# Patient Record
Sex: MALE | Race: WHITE | NOT HISPANIC OR LATINO | Employment: STUDENT | URBAN - METROPOLITAN AREA
[De-identification: names, ages, dates, MRNs, and addresses within clinical notes are randomized per-mention and may not be internally consistent; named-entity substitution may affect disease eponyms.]

---
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
## 2023-09-06 ENCOUNTER — APPOINTMENT (OUTPATIENT)
Dept: RADIOLOGY | Facility: IMAGING CENTER | Age: 23
End: 2023-09-06
Payer: COMMERCIAL

## 2023-09-06 ENCOUNTER — OFFICE VISIT (OUTPATIENT)
Dept: URGENT CARE | Facility: CLINIC | Age: 23
End: 2023-09-06
Payer: COMMERCIAL

## 2023-09-06 VITALS
OXYGEN SATURATION: 98 % | RESPIRATION RATE: 16 BRPM | WEIGHT: 157 LBS | BODY MASS INDEX: 21.26 KG/M2 | HEIGHT: 72 IN | TEMPERATURE: 97.4 F | SYSTOLIC BLOOD PRESSURE: 106 MMHG | HEART RATE: 86 BPM | DIASTOLIC BLOOD PRESSURE: 64 MMHG

## 2023-09-06 DIAGNOSIS — R05.1 ACUTE COUGH: Primary | ICD-10-CM

## 2023-09-06 DIAGNOSIS — J02.9 PHARYNGITIS, UNSPECIFIED ETIOLOGY: ICD-10-CM

## 2023-09-06 DIAGNOSIS — R06.02 SHORTNESS OF BREATH: ICD-10-CM

## 2023-09-06 DIAGNOSIS — R05.1 ACUTE COUGH: ICD-10-CM

## 2023-09-06 LAB
FLUAV RNA SPEC QL NAA+PROBE: NEGATIVE
FLUBV RNA SPEC QL NAA+PROBE: NEGATIVE
RSV RNA SPEC QL NAA+PROBE: NEGATIVE
S PYO DNA SPEC NAA+PROBE: NOT DETECTED
SARS-COV-2 RNA RESP QL NAA+PROBE: NEGATIVE

## 2023-09-06 PROCEDURE — 71046 X-RAY EXAM CHEST 2 VIEWS: CPT | Mod: TC | Performed by: RADIOLOGY

## 2023-09-06 PROCEDURE — 87651 STREP A DNA AMP PROBE: CPT

## 2023-09-06 PROCEDURE — 3074F SYST BP LT 130 MM HG: CPT

## 2023-09-06 PROCEDURE — 99213 OFFICE O/P EST LOW 20 MIN: CPT

## 2023-09-06 PROCEDURE — 3078F DIAST BP <80 MM HG: CPT

## 2023-09-06 PROCEDURE — 0241U POCT CEPHEID COV-2, FLU A/B, RSV - PCR: CPT

## 2023-09-06 ASSESSMENT — ENCOUNTER SYMPTOMS
FEVER: 1
SHORTNESS OF BREATH: 1
WHEEZING: 1
EYE REDNESS: 0
SORE THROAT: 1
COUGH: 1
EYE DISCHARGE: 0
SPUTUM PRODUCTION: 1
CHILLS: 1
SINUS PAIN: 1

## 2023-09-06 NOTE — PROGRESS NOTES
Subjective:   Iker Dumont is a 22 y.o. male who presents for Shortness of Breath (Pt concerned for bronchitis)      HPI:Iker comes in today c/o night sweats, acute cough, shortness of breath, coughing up green phlegm for the last 3 days. Onset was 2 weeks ago when he describes viral upper respiratory symptoms, congestion, sore throat, cough, however he states the cough is getting worse, he is getting increased shortness of breath, and coughing up green phlegm for the last 3 days..  Patient describes symptoms as consistent. Aggravating factors include exertion.  He also states that he rented a room in the basement, which has no airflow, no ventilation, has a boiler and there, and he believes that these living conditions have contributed to his illness.  He is asking for a note that he can give to his landlord to say that he has to get out of the rental agreement because the basement apartment is causing him to be ill.  Relieving factors include none. Treatments tried at home include none. They describe their symptoms as severe.    Review of Systems   Constitutional:  Positive for chills, fever and malaise/fatigue.   HENT:  Positive for congestion, sinus pain and sore throat. Negative for ear discharge, ear pain, nosebleeds and tinnitus.    Eyes:  Negative for discharge and redness.   Respiratory:  Positive for cough, sputum production, shortness of breath and wheezing.    Cardiovascular:  Negative for chest pain and leg swelling.   Skin:  Negative for rash.   All other systems reviewed and are negative.      Medications: This patient does not have an active medication from one of the medication groupers.    Allergies: Patient has no known allergies.    Problem List: does not have a problem list on file.    Surgical History:  No past surgical history on file.    Past Social Hx:   reports that he has never smoked. He has never used smokeless tobacco. He reports that he does not drink alcohol and does not use  drugs.     Past Family Hx:   family history is not on file.     Problem list, medications, and allergies reviewed by myself today in Epic.     Objective:     /64 (BP Location: Left arm, Patient Position: Sitting, BP Cuff Size: Adult)   Pulse 86   Temp 36.3 °C (97.4 °F) (Temporal)   Resp 16   Ht 1.829 m (6')   Wt 71.2 kg (157 lb)   SpO2 98%   BMI 21.29 kg/m²     During this visit, appropriate PPE was worn, and hand hygiene was performed.    Physical Exam  Vitals reviewed.   Constitutional:       General: He is not in acute distress.     Appearance: Normal appearance. He is not ill-appearing, toxic-appearing or diaphoretic.   HENT:      Head: Normocephalic and atraumatic.      Right Ear: Tympanic membrane, ear canal and external ear normal. There is no impacted cerumen.      Left Ear: Tympanic membrane, ear canal and external ear normal. There is no impacted cerumen.      Nose: Congestion present. No rhinorrhea.      Mouth/Throat:      Mouth: Mucous membranes are moist.      Pharynx: Oropharynx is clear. Posterior oropharyngeal erythema present. No oropharyngeal exudate.   Eyes:      General: No scleral icterus.        Right eye: No discharge.         Left eye: No discharge.      Extraocular Movements: Extraocular movements intact.      Conjunctiva/sclera: Conjunctivae normal.      Pupils: Pupils are equal, round, and reactive to light.   Cardiovascular:      Rate and Rhythm: Normal rate and regular rhythm.      Heart sounds: Normal heart sounds. No murmur heard.     No friction rub. No gallop.   Pulmonary:      Breath sounds: No rales.      Comments: There are some scattered rhonchi, relieved on coughing.  Mildly diminished breath sounds left lower lobe.  Abdominal:      General: There is no distension.   Musculoskeletal:         General: Normal range of motion.      Cervical back: Normal range of motion. No rigidity.      Right lower leg: No edema.      Left lower leg: No edema.   Lymphadenopathy:       Cervical: No cervical adenopathy.   Skin:     General: Skin is warm and dry.      Coloration: Skin is not jaundiced.   Neurological:      Mental Status: He is alert and oriented to person, place, and time. Mental status is at baseline.   Psychiatric:         Mood and Affect: Mood normal.         Behavior: Behavior normal.         RADIOLOGY RESULTS   DX-CHEST-2 VIEWS    Result Date: 9/6/2023 9/6/2023 11:12 AM HISTORY/REASON FOR EXAM:  Shortness of Breath Cough. TECHNIQUE/EXAM DESCRIPTION AND NUMBER OF VIEWS: Two views of the chest. COMPARISON:  None available. FINDINGS: Cardiomediastinal contour is within normal limits. No focal pulmonary consolidation. No pleural fluid collection or pneumothorax. No major bony abnormality is seen.     No acute cardiopulmonary disease.          Lab Results/POC Test Results   Results for orders placed or performed in visit on 09/06/23   POCT CEPHEID GROUP A STREP - PCR   Result Value Ref Range    POC Group A Strep, PCR Not Detected Not Detected, Invalid   POCT CEPHEID COV-2, FLU A/B, RSV - PCR   Result Value Ref Range    SARS-CoV-2 by PCR Negative Negative, Invalid    Influenza virus A RNA Negative Negative, Invalid    Influenza virus B, PCR Negative Negative, Invalid    RSV, PCR Negative Negative, Invalid               Assessment/Plan:     Diagnosis and associated orders:     No diagnosis found.   Comments/MDM:     1. Acute cough  - DX-CHEST-2 VIEWS; Future  - POCT CEPHEID COV-2, FLU A/B, RSV - PCR    2. Pharyngitis, unspecified etiology  - POCT CEPHEID GROUP A STREP - PCR    3. Shortness of breath  - DX-CHEST-2 VIEWS; Future  - POCT CEPHEID COV-2, FLU A/B, RSV - PCR  After the initial interview and physical exam, I did tell patient I would send him for chest x-ray, have the MA swab him for strep COVID, flu, RSV and that I would be back in to see him.  He stated he understood.  He was somewhat rude during the visit for no reason.  He was ranting about how upset he was at the  basement apartment he had rented, however it was not anything like they told him it was, and how he thought it was making him sick.  He asked me to write a letter that he could give to the landlord saying that he had to get out of his rental agreement because the apartment was making him sick.  I told him I would discuss this further with him after we get the results of the x-ray and the PCR test, and figure out what was going on with him.  I did go back to the room to see the patient again to discuss a treatment plan but I was told he had left the clinic after his x-ray.  I did call him to discuss results with him, however he did not answer the phone, and there is no voicemail set up to leave a message.  I was therefore not able to discuss his results or treatment plan with him.          I personally reviewed prior external notes and test results pertinent to today's visit.  I have independently reviewed and interpreted all diagnostics ordered during this urgent care acute visit.        Please note that this dictation was created using voice recognition software. I have made a reasonable attempt to correct obvious errors, but I expect that there are errors of grammar and possibly content that I did not discover before finalizing the note.    This note was electronically signed by JEEVAN Liao